# Patient Record
Sex: FEMALE | URBAN - METROPOLITAN AREA
[De-identification: names, ages, dates, MRNs, and addresses within clinical notes are randomized per-mention and may not be internally consistent; named-entity substitution may affect disease eponyms.]

---

## 2022-01-20 ENCOUNTER — HOSPITAL ENCOUNTER (OUTPATIENT)
Age: 82
Setting detail: SPECIMEN
Discharge: HOME | End: 2022-01-20

## 2022-01-20 LAB
HCT VFR BLD CALC: 26.9 % (ref 36.3–47.1)
HEMOGLOBIN: 7.4 G/DL (ref 11.9–15.1)
MCH RBC QN AUTO: 22.6 PG (ref 25.2–33.5)
MCHC RBC AUTO-ENTMCNC: 27.5 G/DL (ref 28.4–34.8)
MCV RBC AUTO: 82 FL (ref 82.6–102.9)
NRBC AUTOMATED: 0 PER 100 WBC
PDW BLD-RTO: 16.4 % (ref 11.8–14.4)
PLATELET # BLD: 191 K/UL (ref 138–453)
PMV BLD AUTO: 10.4 FL (ref 8.1–13.5)
RBC # BLD: 3.28 M/UL (ref 3.95–5.11)
WBC # BLD: 5.3 K/UL (ref 3.5–11.3)

## 2022-01-21 ENCOUNTER — TELEPHONE (OUTPATIENT)
Dept: GASTROENTEROLOGY | Age: 82
End: 2022-01-21

## 2022-01-21 NOTE — TELEPHONE ENCOUNTER
Received a call from [facility] [place] stating that the patient has a procedure and they forgot to tell us some things from the cardiologist.  Dahiana Damon to have the patient call due to nobody being on her hippa. Please note that I do not see anything in her chart for this. Awaiting call back.

## 2022-01-28 ENCOUNTER — HOSPITAL ENCOUNTER (OUTPATIENT)
Age: 82
Setting detail: SPECIMEN
Discharge: HOME | End: 2022-01-28

## 2022-01-28 LAB
HCT VFR BLD CALC: 29.4 % (ref 36.3–47.1)
HEMOGLOBIN: 8.4 G/DL (ref 11.9–15.1)
MCH RBC QN AUTO: 23.9 PG (ref 25.2–33.5)
MCHC RBC AUTO-ENTMCNC: 28.6 G/DL (ref 28.4–34.8)
MCV RBC AUTO: 83.5 FL (ref 82.6–102.9)
NRBC AUTOMATED: 0 PER 100 WBC
PDW BLD-RTO: 22.3 % (ref 11.8–14.4)
PLATELET # BLD: 168 K/UL (ref 138–453)
PMV BLD AUTO: 10.1 FL (ref 8.1–13.5)
RBC # BLD: 3.52 M/UL (ref 3.95–5.11)
WBC # BLD: 5.3 K/UL (ref 3.5–11.3)